# Patient Record
Sex: FEMALE | Race: WHITE | Employment: OTHER | URBAN - NONMETROPOLITAN AREA
[De-identification: names, ages, dates, MRNs, and addresses within clinical notes are randomized per-mention and may not be internally consistent; named-entity substitution may affect disease eponyms.]

---

## 2018-01-09 ENCOUNTER — HOSPITAL ENCOUNTER (OUTPATIENT)
Dept: WOMENS IMAGING | Age: 68
Discharge: HOME | End: 2018-01-09
Payer: MEDICARE

## 2018-01-09 DIAGNOSIS — Z12.31 VISIT FOR SCREENING MAMMOGRAM: ICD-10-CM

## 2018-01-09 PROCEDURE — 77063 BREAST TOMOSYNTHESIS BI: CPT

## 2018-08-24 ENCOUNTER — HOSPITAL ENCOUNTER (OUTPATIENT)
Dept: WOMENS IMAGING | Age: 68
Discharge: HOME | End: 2018-08-24
Payer: MEDICARE

## 2018-08-24 DIAGNOSIS — Z12.31 VISIT FOR SCREENING MAMMOGRAM: ICD-10-CM

## 2018-08-24 DIAGNOSIS — Z78.0 POSTMENOPAUSAL: ICD-10-CM

## 2018-08-24 PROCEDURE — 77080 DXA BONE DENSITY AXIAL: CPT

## 2018-09-04 ENCOUNTER — OFFICE VISIT (OUTPATIENT)
Dept: GASTROENTEROLOGY | Age: 68
End: 2018-09-04
Payer: MEDICARE

## 2018-09-04 VITALS
DIASTOLIC BLOOD PRESSURE: 64 MMHG | SYSTOLIC BLOOD PRESSURE: 118 MMHG | WEIGHT: 154.2 LBS | HEART RATE: 76 BPM | HEIGHT: 62 IN | OXYGEN SATURATION: 98 % | BODY MASS INDEX: 28.37 KG/M2

## 2018-09-04 DIAGNOSIS — K21.9 CHRONIC GERD: Primary | ICD-10-CM

## 2018-09-04 DIAGNOSIS — R07.89 CHEST FULLNESS: ICD-10-CM

## 2018-09-04 PROCEDURE — 1101F PT FALLS ASSESS-DOCD LE1/YR: CPT | Performed by: NURSE PRACTITIONER

## 2018-09-04 PROCEDURE — 4040F PNEUMOC VAC/ADMIN/RCVD: CPT | Performed by: NURSE PRACTITIONER

## 2018-09-04 PROCEDURE — G8399 PT W/DXA RESULTS DOCUMENT: HCPCS | Performed by: NURSE PRACTITIONER

## 2018-09-04 PROCEDURE — G8427 DOCREV CUR MEDS BY ELIG CLIN: HCPCS | Performed by: NURSE PRACTITIONER

## 2018-09-04 PROCEDURE — 3017F COLORECTAL CA SCREEN DOC REV: CPT | Performed by: NURSE PRACTITIONER

## 2018-09-04 PROCEDURE — 1123F ACP DISCUSS/DSCN MKR DOCD: CPT | Performed by: NURSE PRACTITIONER

## 2018-09-04 PROCEDURE — G8419 CALC BMI OUT NRM PARAM NOF/U: HCPCS | Performed by: NURSE PRACTITIONER

## 2018-09-04 PROCEDURE — 1090F PRES/ABSN URINE INCON ASSESS: CPT | Performed by: NURSE PRACTITIONER

## 2018-09-04 PROCEDURE — 1036F TOBACCO NON-USER: CPT | Performed by: NURSE PRACTITIONER

## 2018-09-04 PROCEDURE — 99214 OFFICE O/P EST MOD 30 MIN: CPT | Performed by: NURSE PRACTITIONER

## 2018-09-04 ASSESSMENT — ENCOUNTER SYMPTOMS
BLOOD IN STOOL: 0
VOICE CHANGE: 0
NAUSEA: 0
COUGH: 0
SHORTNESS OF BREATH: 0
DIARRHEA: 0
RECTAL PAIN: 0
VOMITING: 0
ABDOMINAL DISTENTION: 0
CHEST TIGHTNESS: 1
BACK PAIN: 0
ABDOMINAL PAIN: 0
CONSTIPATION: 0
SORE THROAT: 0

## 2018-12-03 ENCOUNTER — ANESTHESIA EVENT (OUTPATIENT)
Dept: OPERATING ROOM | Age: 68
End: 2018-12-03

## 2018-12-04 ENCOUNTER — HOSPITAL ENCOUNTER (OUTPATIENT)
Age: 68
Setting detail: OUTPATIENT SURGERY
Discharge: HOME | End: 2018-12-04
Attending: INTERNAL MEDICINE | Admitting: INTERNAL MEDICINE
Payer: MEDICARE

## 2018-12-04 ENCOUNTER — APPOINTMENT (OUTPATIENT)
Dept: OPERATING ROOM | Age: 68
End: 2018-12-04

## 2018-12-04 ENCOUNTER — ANESTHESIA (OUTPATIENT)
Dept: OPERATING ROOM | Age: 68
End: 2018-12-04

## 2018-12-04 ENCOUNTER — HOSPITAL ENCOUNTER (OUTPATIENT)
Age: 68
Setting detail: SPECIMEN
Discharge: HOME | End: 2018-12-04
Payer: MEDICARE

## 2018-12-04 VITALS
OXYGEN SATURATION: 98 % | WEIGHT: 150 LBS | DIASTOLIC BLOOD PRESSURE: 54 MMHG | HEIGHT: 62 IN | TEMPERATURE: 98.3 F | BODY MASS INDEX: 27.6 KG/M2 | HEART RATE: 74 BPM | SYSTOLIC BLOOD PRESSURE: 124 MMHG | RESPIRATION RATE: 20 BRPM

## 2018-12-04 VITALS — OXYGEN SATURATION: 91 % | DIASTOLIC BLOOD PRESSURE: 57 MMHG | SYSTOLIC BLOOD PRESSURE: 133 MMHG

## 2018-12-04 PROCEDURE — 43239 EGD BIOPSY SINGLE/MULTIPLE: CPT

## 2018-12-04 PROCEDURE — G8907 PT DOC NO EVENTS ON DISCHARG: HCPCS

## 2018-12-04 PROCEDURE — G8918 PT W/O PREOP ORDER IV AB PRO: HCPCS

## 2018-12-04 PROCEDURE — 43239 EGD BIOPSY SINGLE/MULTIPLE: CPT | Performed by: INTERNAL MEDICINE

## 2018-12-04 PROCEDURE — 88305 TISSUE EXAM BY PATHOLOGIST: CPT

## 2018-12-04 PROCEDURE — 88312 SPECIAL STAINS GROUP 1: CPT

## 2018-12-04 RX ORDER — LIDOCAINE HYDROCHLORIDE 10 MG/ML
1 INJECTION, SOLUTION EPIDURAL; INFILTRATION; INTRACAUDAL; PERINEURAL
Status: COMPLETED | OUTPATIENT
Start: 2018-12-04 | End: 2018-12-04

## 2018-12-04 RX ORDER — PROPOFOL 10 MG/ML
INJECTION, EMULSION INTRAVENOUS PRN
Status: DISCONTINUED | OUTPATIENT
Start: 2018-12-04 | End: 2018-12-04 | Stop reason: SDUPTHER

## 2018-12-04 RX ORDER — SODIUM CHLORIDE 9 MG/ML
INJECTION, SOLUTION INTRAVENOUS CONTINUOUS
Status: DISCONTINUED | OUTPATIENT
Start: 2018-12-04 | End: 2018-12-04 | Stop reason: HOSPADM

## 2018-12-04 RX ADMIN — LIDOCAINE HYDROCHLORIDE 30 MG: 10 INJECTION, SOLUTION EPIDURAL; INFILTRATION; INTRACAUDAL; PERINEURAL at 08:20

## 2018-12-04 RX ADMIN — SODIUM CHLORIDE: 9 INJECTION, SOLUTION INTRAVENOUS at 08:17

## 2018-12-04 RX ADMIN — PROPOFOL 200 MG: 10 INJECTION, EMULSION INTRAVENOUS at 08:20

## 2018-12-04 ASSESSMENT — PAIN SCALES - GENERAL
PAINLEVEL_OUTOF10: 0
PAINLEVEL_OUTOF10: 0

## 2018-12-04 NOTE — H&P
Medical History:  Past Medical History:   Diagnosis Date    Anxiety     GERD (gastroesophageal reflux disease)     Herpes simplex     Insomnia     Migraines     Reflux        Past Surgical History:  Past Surgical History:   Procedure Laterality Date    CHOLECYSTECTOMY  2011    COLONOSCOPY  09/17/2009    DR. HILTON: negative    COLONOSCOPY  09/01/2015    Dr. Td Nolen ENDOSCOPY  09/03/2009    DR. HILTON:  gastritis, gerd. Urease returned pos. Treatment completed with neg f/u [facility]       Social History:  Social History   Substance Use Topics    Smoking status: Former Smoker     Quit date: 6/9/2003    Smokeless tobacco: Never Used    Alcohol use 0.0 oz/week      Comment: social       Vital Signs:   Vitals:    12/04/18 0800   BP: 139/79   Pulse: 80   Resp: 16   Temp: 97 °F (36.1 °C)   SpO2: 99%        Physical Exam:  Cardiac:  [x]WNL  []Comments:  Pulmonary:  [x]WNL   []Comments:  Neuro/Mental Status:  [x]WNL  []Comments:  Abdominal:  [x]WNL    []Comments:  Other:   []WNL  []Comments:    Informed Consent:  The risks and benefits of the procedure have been discussed with either the patient or if they cannot consent, their representative. Assessment:  Patient examined and appropriate for planned sedation and procedure. Plan:  Proceed with planned sedation and procedure as above.     Faraz Mckeon MD

## 2019-01-10 ENCOUNTER — HOSPITAL ENCOUNTER (OUTPATIENT)
Dept: WOMENS IMAGING | Age: 69
Discharge: HOME | End: 2019-01-10
Payer: MEDICARE

## 2019-01-10 DIAGNOSIS — Z12.31 ENCOUNTER FOR SCREENING MAMMOGRAM FOR MALIGNANT NEOPLASM OF BREAST: ICD-10-CM

## 2019-01-10 PROCEDURE — 77067 SCR MAMMO BI INCL CAD: CPT

## 2020-01-10 ENCOUNTER — HOSPITAL ENCOUNTER (OUTPATIENT)
Dept: WOMENS IMAGING | Age: 70
Discharge: HOME | End: 2020-01-10
Payer: MEDICARE

## 2020-01-10 PROCEDURE — 77080 DXA BONE DENSITY AXIAL: CPT

## 2020-01-10 PROCEDURE — 77063 BREAST TOMOSYNTHESIS BI: CPT

## 2022-02-09 ENCOUNTER — TRANSCRIBE ORDERS (OUTPATIENT)
Dept: ADMINISTRATIVE | Facility: HOSPITAL | Age: 72
End: 2022-02-09

## 2022-02-09 DIAGNOSIS — R22.2 MASS IN CHEST: Primary | ICD-10-CM

## 2022-02-22 ENCOUNTER — HOSPITAL ENCOUNTER (OUTPATIENT)
Dept: CT IMAGING | Facility: HOSPITAL | Age: 72
Discharge: HOME | End: 2022-02-22
Admitting: PHYSICIAN ASSISTANT

## 2022-02-22 DIAGNOSIS — R22.2 MASS IN CHEST: ICD-10-CM

## 2022-02-22 LAB — CREAT BLDA-MCNC: 0.9 MG/DL (ref 0.6–1.3)

## 2022-02-22 PROCEDURE — 82565 ASSAY OF CREATININE: CPT

## 2022-02-22 PROCEDURE — 25010000002 IOPAMIDOL 61 % SOLUTION: Performed by: PHYSICIAN ASSISTANT

## 2022-02-22 PROCEDURE — 71260 CT THORAX DX C+: CPT

## 2022-02-22 RX ADMIN — IOPAMIDOL 100 ML: 612 INJECTION, SOLUTION INTRAVENOUS at 12:36

## 2022-03-01 ENCOUNTER — TRANSCRIBE ORDERS (OUTPATIENT)
Dept: ADMINISTRATIVE | Facility: HOSPITAL | Age: 72
End: 2022-03-01

## 2022-03-01 DIAGNOSIS — R91.8 OTHER NONSPECIFIC ABNORMAL FINDING OF LUNG FIELD: Primary | ICD-10-CM

## 2022-03-03 ENCOUNTER — HOSPITAL ENCOUNTER (OUTPATIENT)
Dept: CT IMAGING | Facility: HOSPITAL | Age: 72
Discharge: HOME | End: 2022-03-03

## 2022-03-03 DIAGNOSIS — R91.8 OTHER NONSPECIFIC ABNORMAL FINDING OF LUNG FIELD: ICD-10-CM

## 2022-03-03 PROCEDURE — A9552 F18 FDG: HCPCS | Performed by: PHYSICIAN ASSISTANT

## 2022-03-03 PROCEDURE — 0 FLUDEOXYGLUCOSE F18 SOLUTION: Performed by: PHYSICIAN ASSISTANT

## 2022-03-03 PROCEDURE — 78815 PET IMAGE W/CT SKULL-THIGH: CPT

## 2022-03-03 RX ADMIN — FLUDEOXYGLUCOSE F18 1 DOSE: 300 INJECTION INTRAVENOUS at 09:17

## 2022-08-17 ENCOUNTER — HOSPITAL ENCOUNTER (OUTPATIENT)
Dept: CT IMAGING | Age: 72
Discharge: HOME | End: 2022-08-17
Payer: MEDICARE

## 2022-08-17 DIAGNOSIS — R91.1 LUNG NODULE: ICD-10-CM

## 2022-08-17 PROCEDURE — 71250 CT THORAX DX C-: CPT | Performed by: RADIOLOGY

## 2022-08-17 PROCEDURE — 71250 CT THORAX DX C-: CPT

## 2022-09-08 ENCOUNTER — HOSPITAL ENCOUNTER (OUTPATIENT)
Dept: NON INVASIVE DIAGNOSTICS | Age: 72
Discharge: HOME | End: 2022-09-08
Payer: MEDICARE

## 2022-09-08 DIAGNOSIS — R60.0 LOCALIZED EDEMA: ICD-10-CM

## 2022-09-08 DIAGNOSIS — M17.11 UNILATERAL PRIMARY OSTEOARTHRITIS, RIGHT KNEE: ICD-10-CM

## 2022-09-08 PROCEDURE — 93971 EXTREMITY STUDY: CPT

## 2023-01-20 ENCOUNTER — DOCUMENTATION (OUTPATIENT)
Dept: CT IMAGING | Facility: HOSPITAL | Age: 73
End: 2023-01-20
Payer: MEDICARE

## 2023-01-20 NOTE — PROGRESS NOTES
Spoke with office and they are going to order f/u imaging in Feb but nurse said patient wanted to go to [facility] so I will check in Care Everywhere before deactivating around that time.

## 2023-02-02 ENCOUNTER — HOSPITAL ENCOUNTER (OUTPATIENT)
Dept: CT IMAGING | Age: 73
Discharge: HOME | End: 2023-02-02
Payer: MEDICARE

## 2023-02-02 DIAGNOSIS — R91.1 SOLITARY PULMONARY NODULE: ICD-10-CM

## 2023-02-02 PROCEDURE — 71250 CT THORAX DX C-: CPT

## 2023-02-02 PROCEDURE — 71250 CT THORAX DX C-: CPT | Performed by: RADIOLOGY

## 2025-07-08 ENCOUNTER — TELEPHONE (OUTPATIENT)
Dept: GASTROENTEROLOGY | Age: 75
End: 2025-07-08